# Patient Record
(demographics unavailable — no encounter records)

---

## 2024-11-06 NOTE — ASSESSMENT
[FreeTextEntry1] : 1. CAD/Chronic Systolic HF: patient states he suffered an MI in the [facility] in February 2021. He did not undergo a cardiac catheterization and was just treated medically. He had two echocardiograms (May 2021, May 2022) which report normal LV EF but multiple regional wall motion abnormalities, so I found it difficult to believe he had a normal LV EF. I repeated a TTE in our office and his LV EF was 33% with RWMA that suggest infarcted tissue/scar. I am recommending he continue Plavix 75mg daily and isosorbide mononitrate ER 60mg daily. Patient is euvolemic, so I don't see an indication for Lasix 40mg daily. He should remain on Toprol XL 25mg daily (high risk medication with no signs of toxicity). Continue spironolactone. I reviewed his labs from 10/5/2022. His LDL is 119 on atorvastatin 40mg daily. I recommended increasing his atorvastatin to 80mg daily, but he was unable to tolerate due to GI upset. I recommended he go back down to atorvastatin 40mg daily and started Zetia 10mg daily. His labs from 4/25/2023, revealed an LDL of 67 on this lipid lowering therapy, so we will continue.  He has been tolerating both well with no issues. Patient has an ischemic looking ECG, however, he is completely asymptomatic. His echocardiogram points to scar and this was confirmed by nuclear stress testing. Putting all the clinical data together, echo, nuclear study, asymptomatic nature, I believe patient would not benefit from revascularization. Since his is now on max GDMT for his HF, we checked an echocardiogram in August 2023. LV EF remains below 35%. Patient was referred to EP and had consultation for ICD indication. Patient declined the ICD.  2. HTN: appears controlled. Stopped Lasix and isosorbide dinitrate in the past.  Continue isosorbide mononitrate ER 60mg daily, continue Toprol XL 25mg daily and Losartan 50mg daily. Continue spironolactone today as described above. Recommend low salt diet. Goal BP less than 130/80.  3. HLD:  I reviewed his labs from 10/5/2022.  Continue Zetia and Lipitor, last LDL 84 on January 12, 2024  4.  Prediabetes -long-term goals blood pressure less than 130/80, A1c less than 7, LDL 70; diabetic diet; consider SGLT2 inhibitor  Aggressive risk factor modification discussed with him at great length through his daughter include regular walking, compliance to medications, low-salt/low-cholesterol/diabetic diet    Follow up in 6 months.

## 2024-11-06 NOTE — CARDIOLOGY SUMMARY
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  [de-identified] : 11/9/2022, Pharmacologic Nuclear Stress Testing: Large severe fixed defects in the anterior, septal, apical walls consistent with infarct.  [de-identified] : 8/2/2023, LV EF 31%, RWMA similar to October 2022 echocardiogram, trace-mild AI, mild MR, trace-mild TR with estimated PASP of 19mmHg 10/24/2022, LV EF 33%, akinesis of distal half of the LV and dyskinetic apical wall, mild MR, trace-mild TR with estimated PASP of 21mmHg. 5/4/2022, LV EF 53/43%??? with multiple regional wall motion abnormalities, no significant valvular disease 5/7/2021, LV EF 66% with multiple regional wall motion abnormalities, no significant valvular disease

## 2024-11-06 NOTE — HISTORY OF PRESENT ILLNESS
[FreeTextEntry1] : Historical Perspective: 70 year old male with PMHx of CAD s/p MI in February 2021, HTN, HLD presents to establish care with a cardiologist in the [place]. Patient states that in February 2021, he had chest pain and went to a doctor in the [facility] the next day when the chest pain resolved. He was told he had an MI. He did not undergo cardiac catheterization. He was placed on medical therapy and had follow up until the summer of 2022, when he moved to the [place]. Patient has been compliant with his medical therapy. He reports no exertional chest pain, dyspnea, or palpitations.   Current Health Status: Patient with no chest pain, SOB, or palpitations. No hospitalizations since seeing me last. Remains compliant with his medications and reports no adverse effects.  Today he is here for echo follow-up. 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

## 2024-11-06 NOTE — PHYSICAL EXAM
[Normal S1, S2] : normal S1, S2 [Murmur] : murmur [Normal] : moves all extremities, no focal deficits, normal speech [de-identified] : No carotid bruits auscultated bilaterally [de-identified] : 1-2/6 systolic ejection murmur base of heart.

## 2025-06-18 NOTE — HISTORY OF PRESENT ILLNESS
[FreeTextEntry1] : Historical Perspective: 71 year old male with PMHx of CAD s/p MI in February 2021, HTN, HLD presents to establish care with a cardiologist in the [place]. Patient states that in February 2021, he had chest pain and went to a doctor in the [facility] the next day when the chest pain resolved. He was told he had an MI. He did not undergo cardiac catheterization. He was placed on medical therapy and had follow up until the summer of 2022, when he moved to the [place]. Patient has been compliant with his medical therapy. He reports no exertional chest pain, dyspnea, or palpitations.   Current Health Status: Patient with no chest pain, SOB, or palpitations. No hospitalizations since seeing me last. Remains compliant with medications and reports no adverse effects.

## 2025-06-18 NOTE — DISCUSSION/SUMMARY
[EKG obtained to assist in diagnosis and management of assessed problem(s)] : EKG obtained to assist in diagnosis and management of assessed problem(s) [FreeTextEntry1] : 1. CAD/Chronic Systolic HF: patient states he suffered an MI in the [facility] in February 2021. He did not undergo a cardiac catheterization and was just treated medically. He had two echocardiograms (May 2021, May 2022) which report normal LV EF but multiple regional wall motion abnormalities, so I found it difficult to believe he had a normal LV EF. I repeated a TTE in our office and his LV EF was 33% with RWMA that suggest infarcted tissue/scar. I am recommending he continue Plavix 75mg daily and isosorbide mononitrate ER 60mg daily. Patient is euvolemic, so I don't see an indication for Lasix 40mg daily. He should remain on Toprol XL 25mg daily. Continue spironolactone. I reviewed his labs from 10/5/2022. His LDL is 119 on atorvastatin 40mg nightly. I recommended increasing his atorvastatin to 80mg nightly, but he was unable to tolerate due to GI upset. I recommended he go back down to atorvastatin 40mg nightly and started Zetia 10mg nightly. His labs from 4/25/2023, revealed an LDL of 67 on this lipid lowering therapy, so we will continue.  He has been tolerating both well with no issues. Patient has an ischemic looking ECG, however, he is completely asymptomatic. His echocardiogram points to scar and this was confirmed by nuclear stress testing. Putting all the clinical data together, echo, nuclear study, asymptomatic nature, I believe patient would not benefit from revascularization. Since his is now on max GDMT for his HF, we checked an echocardiogram in August 2023. LV EF remains below 35%. Patient was referred to EP and had consultation for ICD indication. Patient declined the ICD.  2. HTN: appears controlled. Stopped Lasix and isosorbide dinitrate in the past.  Continue isosorbide mononitrate ER 60mg daily, continue Toprol XL 25mg daily and Losartan 50mg daily. Adding spironolactone today as described above. Recommend low salt diet. Goal BP less than 130/80.  3. HLD:  I reviewed his labs from 10/5/2022. His LDL is 119 on atorvastatin 40mg nightly. I recommended increasing his atorvastatin to 80mg nightly, but he was unable to tolerate due to GI upset. I recommended he go back down to atorvastatin 40mg nightly and started Zetia 10mg nightly. His labs from 4/25/2023, revealed an LDL of 67 on this lipid lowering therapy, so we will continue.  He has been tolerating both well with no issues  Follow up in 6 months.

## 2025-06-18 NOTE — CARDIOLOGY SUMMARY
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  5/1/2024, NSR with LAFB, old anterior infarction and negative precordial T waves.  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  [de-identified] : 11/9/2022, Pharmacologic Nuclear Stress Testing: Large severe fixed defects in the anterior, septal, apical walls consistent with infarct.  [de-identified] : 8/2/2023, LV EF 31%, RWMA similar to October 2022 echocardiogram, trace-mild AI, mild MR, trace-mild TR with estimated PASP of 19mmHg 10/24/2022, LV EF 33%, akinesis of distal half of the LV and dyskinetic apical wall, mild MR, trace-mild TR with estimated PASP of 21mmHg. 5/4/2022, LV EF 53/43%??? with multiple regional wall motion abnormalities, no significant valvular disease 5/7/2021, LV EF 66% with multiple regional wall motion abnormalities, no significant valvular disease

## 2025-06-18 NOTE — CARDIOLOGY SUMMARY
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  5/1/2024, NSR with LAFB, old anterior infarction and negative precordial T waves.  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  [de-identified] : 11/9/2022, Pharmacologic Nuclear Stress Testing: Large severe fixed defects in the anterior, septal, apical walls consistent with infarct.  [de-identified] : 8/2/2023, LV EF 31%, RWMA similar to October 2022 echocardiogram, trace-mild AI, mild MR, trace-mild TR with estimated PASP of 19mmHg 10/24/2022, LV EF 33%, akinesis of distal half of the LV and dyskinetic apical wall, mild MR, trace-mild TR with estimated PASP of 21mmHg. 5/4/2022, LV EF 53/43%??? with multiple regional wall motion abnormalities, no significant valvular disease 5/7/2021, LV EF 66% with multiple regional wall motion abnormalities, no significant valvular disease

## 2025-06-18 NOTE — PHYSICAL EXAM
[Normal S1, S2] : normal S1, S2 [Murmur] : murmur [Normal] : moves all extremities, no focal deficits, normal speech [de-identified] : No carotid bruits auscultated bilaterally [de-identified] : 1-2/6 systolic ejection murmur base of heart.

## 2025-06-18 NOTE — HISTORY OF PRESENT ILLNESS
[FreeTextEntry1] : Historical Perspective: 71 year old male with PMHx of CAD s/p MI in February 2021, HTN, HLD presents to establish care with a cardiologist in the [place]. Patient states that in February 2021, he had chest pain and went to a doctor in the [facility] the next day when the chest pain resolved. He was told he had an MI. He did not undergo cardiac catheterization. He was placed on medical therapy and had follow up until the summer of 2022, when he moved to the [place]. Patient has been compliant with his medical therapy. He reports no exertional chest pain, dyspnea, or palpitations.   Current Health Status: Patient with no chest pain, SOB, or palpitations. No hospitalizations since seeing me last. Remains compliant with medications and reports no adverse effects.

## 2025-06-18 NOTE — PHYSICAL EXAM
[Normal S1, S2] : normal S1, S2 [Murmur] : murmur [Normal] : moves all extremities, no focal deficits, normal speech [de-identified] : No carotid bruits auscultated bilaterally [de-identified] : 1-2/6 systolic ejection murmur base of heart.

## 2025-06-18 NOTE — ADDENDUM
[FreeTextEntry1] : patient may proceed with dental procedure from a cardiology standpoint. OK to stop Plavix 2 days prior and resume immediately post procedure once adequate hemostasis is achieved  VB

## 2025-06-18 NOTE — DISCUSSION/SUMMARY
[EKG obtained to assist in diagnosis and management of assessed problem(s)] : EKG obtained to assist in diagnosis and management of assessed problem(s) [FreeTextEntry1] : 1. CAD/Chronic Systolic HF: patient states he suffered an MI in the [facility] in February 2021. He did not undergo a cardiac catheterization and was just treated medically. He had two echocardiograms (May 2021, May 2022) which report normal LV EF but multiple regional wall motion abnormalities, so I found it difficult to believe he had a normal LV EF. I repeated a TTE in our office and his LV EF was 33% with RWMA that suggest infarcted tissue/scar. I am recommending he continue Plavix 75mg daily and isosorbide mononitrate ER 60mg daily. Patient is euvolemic, so I don't see an indication for Lasix 40mg daily. He should remain on Toprol XL 25mg daily. Continue spironolactone. I reviewed his labs from 10/5/2022. His LDL is 119 on atorvastatin 40mg nightly. I recommended increasing his atorvastatin to 80mg nightly, but he was unable to tolerate due to GI upset. I recommended he go back down to atorvastatin 40mg nightly and started Zetia 10mg nightly. His labs from 4/25/2023, revealed an LDL of 67 on this lipid lowering therapy, so we will continue.  He has been tolerating both well with no issues. Patient has an ischemic looking ECG, however, he is completely asymptomatic. His echocardiogram points to scar and this was confirmed by nuclear stress testing. Putting all the clinical data together, echo, nuclear study, asymptomatic nature, I believe patient would not benefit from revascularization. Since his is now on max GDMT for his HF, we checked an echocardiogram in August 2023. LV EF remains below 35%. Patient was referred to EP and had consultation for ICD indication. Patient declined the ICD.  2. HTN: appears controlled. Stopped Lasix and isosorbide dinitrate in the past.  Continue isosorbide mononitrate ER 60mg daily, continue Toprol XL 25mg daily and Losartan 50mg daily. Adding spironolactone today as described above. Recommend low salt diet. Goal BP less than 130/80.  3. HLD:  I reviewed his labs from 10/5/2022. His LDL is 119 on atorvastatin 40mg nightly. I recommended increasing his atorvastatin to 80mg nightly, but he was unable to tolerate due to GI upset. I recommended he go back down to atorvastatin 40mg nightly and started Zetia 10mg nightly. His labs from 4/25/2023, revealed an LDL of 67 on this lipid lowering therapy, so we will continue.  He has been tolerating both well with no issues  Follow up in 6 months.